# Patient Record
Sex: FEMALE | Race: WHITE | NOT HISPANIC OR LATINO | URBAN - METROPOLITAN AREA
[De-identification: names, ages, dates, MRNs, and addresses within clinical notes are randomized per-mention and may not be internally consistent; named-entity substitution may affect disease eponyms.]

---

## 2017-04-13 ENCOUNTER — OUTPATIENT (OUTPATIENT)
Dept: OUTPATIENT SERVICES | Facility: HOSPITAL | Age: 67
LOS: 1 days | Discharge: HOME | End: 2017-04-13

## 2017-06-27 DIAGNOSIS — R92.8 OTHER ABNORMAL AND INCONCLUSIVE FINDINGS ON DIAGNOSTIC IMAGING OF BREAST: ICD-10-CM

## 2018-04-16 ENCOUNTER — OUTPATIENT (OUTPATIENT)
Dept: OUTPATIENT SERVICES | Facility: HOSPITAL | Age: 68
LOS: 1 days | Discharge: HOME | End: 2018-04-16

## 2018-04-16 DIAGNOSIS — Z12.31 ENCOUNTER FOR SCREENING MAMMOGRAM FOR MALIGNANT NEOPLASM OF BREAST: ICD-10-CM

## 2019-02-03 ENCOUNTER — EMERGENCY (EMERGENCY)
Facility: HOSPITAL | Age: 69
LOS: 1 days | Discharge: HOME | End: 2019-02-05
Attending: EMERGENCY MEDICINE | Admitting: EMERGENCY MEDICINE

## 2019-02-03 VITALS
TEMPERATURE: 97 F | DIASTOLIC BLOOD PRESSURE: 63 MMHG | HEART RATE: 108 BPM | RESPIRATION RATE: 18 BRPM | OXYGEN SATURATION: 95 % | SYSTOLIC BLOOD PRESSURE: 126 MMHG

## 2019-02-03 LAB
ALBUMIN SERPL ELPH-MCNC: 4.1 G/DL — SIGNIFICANT CHANGE UP (ref 3.5–5.2)
ALP SERPL-CCNC: 79 U/L — SIGNIFICANT CHANGE UP (ref 30–115)
ALT FLD-CCNC: 24 U/L — SIGNIFICANT CHANGE UP (ref 0–41)
ANION GAP SERPL CALC-SCNC: 19 MMOL/L — HIGH (ref 7–14)
APPEARANCE UR: ABNORMAL
AST SERPL-CCNC: 24 U/L — SIGNIFICANT CHANGE UP (ref 0–41)
BACTERIA # UR AUTO: ABNORMAL /HPF
BILIRUB SERPL-MCNC: 0.7 MG/DL — SIGNIFICANT CHANGE UP (ref 0.2–1.2)
BILIRUB UR-MCNC: NEGATIVE — SIGNIFICANT CHANGE UP
BUN SERPL-MCNC: 26 MG/DL — HIGH (ref 10–20)
CALCIUM SERPL-MCNC: 9.3 MG/DL — SIGNIFICANT CHANGE UP (ref 8.5–10.1)
CHLORIDE SERPL-SCNC: 96 MMOL/L — LOW (ref 98–110)
CO2 SERPL-SCNC: 23 MMOL/L — SIGNIFICANT CHANGE UP (ref 17–32)
COLOR SPEC: SIGNIFICANT CHANGE UP
CREAT SERPL-MCNC: 1.1 MG/DL — SIGNIFICANT CHANGE UP (ref 0.7–1.5)
DIFF PNL FLD: NEGATIVE — SIGNIFICANT CHANGE UP
EPI CELLS # UR: ABNORMAL /HPF
GLUCOSE SERPL-MCNC: 145 MG/DL — HIGH (ref 70–99)
GLUCOSE UR QL: NEGATIVE — SIGNIFICANT CHANGE UP
HCT VFR BLD CALC: 42.5 % — SIGNIFICANT CHANGE UP (ref 37–47)
HGB BLD-MCNC: 13.9 G/DL — SIGNIFICANT CHANGE UP (ref 12–16)
KETONES UR-MCNC: NEGATIVE — SIGNIFICANT CHANGE UP
LEUKOCYTE ESTERASE UR-ACNC: ABNORMAL
LIDOCAIN IGE QN: 32 U/L — SIGNIFICANT CHANGE UP (ref 7–60)
MCHC RBC-ENTMCNC: 28.1 PG — SIGNIFICANT CHANGE UP (ref 27–31)
MCHC RBC-ENTMCNC: 32.7 G/DL — SIGNIFICANT CHANGE UP (ref 32–37)
MCV RBC AUTO: 85.9 FL — SIGNIFICANT CHANGE UP (ref 81–99)
NITRITE UR-MCNC: NEGATIVE — SIGNIFICANT CHANGE UP
NRBC # BLD: 0 /100 WBCS — SIGNIFICANT CHANGE UP (ref 0–0)
PH UR: 6 — SIGNIFICANT CHANGE UP (ref 5–8)
PLATELET # BLD AUTO: 333 K/UL — SIGNIFICANT CHANGE UP (ref 130–400)
POTASSIUM SERPL-MCNC: 4.4 MMOL/L — SIGNIFICANT CHANGE UP (ref 3.5–5)
POTASSIUM SERPL-SCNC: 4.4 MMOL/L — SIGNIFICANT CHANGE UP (ref 3.5–5)
PROT SERPL-MCNC: 7.2 G/DL — SIGNIFICANT CHANGE UP (ref 6–8)
PROT UR-MCNC: ABNORMAL
RBC # BLD: 4.95 M/UL — SIGNIFICANT CHANGE UP (ref 4.2–5.4)
RBC # FLD: 14.3 % — SIGNIFICANT CHANGE UP (ref 11.5–14.5)
SODIUM SERPL-SCNC: 138 MMOL/L — SIGNIFICANT CHANGE UP (ref 135–146)
SP GR SPEC: >=1.03 — SIGNIFICANT CHANGE UP (ref 1.01–1.03)
TROPONIN T SERPL-MCNC: <0.01 NG/ML — SIGNIFICANT CHANGE UP
UROBILINOGEN FLD QL: 0.2 — SIGNIFICANT CHANGE UP (ref 0.2–0.2)
WBC # BLD: 11.17 K/UL — HIGH (ref 4.8–10.8)
WBC # FLD AUTO: 11.17 K/UL — HIGH (ref 4.8–10.8)
WBC UR QL: ABNORMAL /HPF

## 2019-02-03 RX ORDER — ACETAMINOPHEN 500 MG
650 TABLET ORAL ONCE
Qty: 0 | Refills: 0 | Status: COMPLETED | OUTPATIENT
Start: 2019-02-03 | End: 2019-02-03

## 2019-02-03 RX ORDER — CEFTRIAXONE 500 MG/1
1 INJECTION, POWDER, FOR SOLUTION INTRAMUSCULAR; INTRAVENOUS ONCE
Qty: 0 | Refills: 0 | Status: COMPLETED | OUTPATIENT
Start: 2019-02-03 | End: 2019-02-03

## 2019-02-03 RX ORDER — SODIUM CHLORIDE 9 MG/ML
1000 INJECTION, SOLUTION INTRAVENOUS ONCE
Qty: 0 | Refills: 0 | Status: COMPLETED | OUTPATIENT
Start: 2019-02-03 | End: 2019-02-03

## 2019-02-03 RX ADMIN — CEFTRIAXONE 100 GRAM(S): 500 INJECTION, POWDER, FOR SOLUTION INTRAMUSCULAR; INTRAVENOUS at 23:38

## 2019-02-03 RX ADMIN — Medication 650 MILLIGRAM(S): at 20:32

## 2019-02-03 RX ADMIN — SODIUM CHLORIDE 1000 MILLILITER(S): 9 INJECTION, SOLUTION INTRAVENOUS at 20:32

## 2019-02-03 NOTE — ED PROVIDER NOTE - OBJECTIVE STATEMENT
69 yo female with PMH DM, HTN, hypothyroidism, HLD, Hep A presented c/o abdominal pain x 1 day. Pt also reports intermittent CP, only with exertion. States feels SOB at times with climbing stairs and pain comes on with exertion. + dry cough and loose stools. No vomiting, urinary complaints or flank pain. + dull HA; denies any focal weakness or paresthesias; did not treat with any OTC meds. Denies trauma.

## 2019-02-03 NOTE — ED PROVIDER NOTE - PHYSICAL EXAMINATION
VITAL SIGNS: noted  CONSTITUTIONAL: Well-developed; well-nourished; in no acute distress  HEAD: Normocephalic; atraumatic  EYES: PERRL, EOM intact; conjunctiva and sclera clear  ENT: No nasal discharge; airway clear. MMM  NECK: Supple; non tender.   CARD: S1, S2 normal; no murmurs, gallops, or rubs. Regular rate and rhythm  RESP: CTAB/L, no wheezes, rales or rhonchi  ABD: Normal bowel sounds; soft; non-distended; +LLQ tenderness without any rebound or guarding; no hepatosplenomegaly. No CVA tenderness  EXT: Normal ROM. No calf tenderness or edema. Distal pulses intact  NEURO: Alert, oriented. Grossly unremarkable. No focal deficits  SKIN: Skin exam is warm and dry, no acute rash  MS: No midline spinal tenderness

## 2019-02-03 NOTE — ED ADULT TRIAGE NOTE - CHIEF COMPLAINT QUOTE
patient c/o abdominal pain/diarrhea for 2 days and dizziness/sob that started today. denies fevers/chills

## 2019-02-03 NOTE — ED CDU PROVIDER INITIAL DAY NOTE - OBJECTIVE STATEMENT
69y/o female with pmh of breast ca, dm, htn, hld, pt. states that yesterday she had some abdominal pain associated with multiple episodes of soft stool, no diarrhea. today pt. developed dizziness associated with nausea, ha and near syncopal episode. now feels better. denies cp, sob, vomiting. pt. is not a smoker. in the past pt. saw dr. Baldwin but now wants to be f/u with dr. Lee. last stress test was a long time ago.

## 2019-02-03 NOTE — ED ADULT NURSE NOTE - OBJECTIVE STATEMENT
Pt presents to ED, sent from urgent care, c/o abdominal cramping, nausea, diarrhea, and fatigue x3 days. Pt reports dizziness & feeling like she was going to pass out. Pt states she no longer feels nausea, denies vomiting. Denies fever/chills.

## 2019-02-03 NOTE — ED CDU PROVIDER INITIAL DAY NOTE - NEURO NEGATIVE STATEMENT, MLM
+ near syncope, + dizziness, no loss of consciousness, no gait abnormality, + headache, no sensory deficits, and no weakness.

## 2019-02-03 NOTE — ED CDU PROVIDER INITIAL DAY NOTE - GASTROINTESTINAL, MLM
Abdomen soft, non-tender, no rebound, no guarding. Abdomen soft, + mild ttp in left lower quadrant, no rebound, no guarding.

## 2019-02-03 NOTE — ED PROVIDER NOTE - MEDICAL DECISION MAKING DETAILS
Pt placed in EDOU for further workup chest pain/DIALLO  r/o ACS. Pt has not had recent cardiac workup. Treated for UTI, CT A/P without acute findings

## 2019-02-03 NOTE — ED CDU PROVIDER INITIAL DAY NOTE - ATTENDING CONTRIBUTION TO CARE
Seen with PA agree with above, lungs- clear, abdomen- soft no tenderness to any region, neuro- non focal, s1s2 no gallops or murmurs, full workup in progress will contiue to re-evaluate

## 2019-02-03 NOTE — ED ADULT NURSE NOTE - NSIMPLEMENTINTERV_GEN_ALL_ED
Implemented All Universal Safety Interventions:  [place] to call system. Call bell, personal items and telephone within reach. Instruct patient to call for assistance. Room bathroom lighting operational. Non-slip footwear when patient is off stretcher. Physically safe environment: no spills, clutter or unnecessary equipment. Stretcher in lowest position, wheels locked, appropriate side rails in place.

## 2019-02-03 NOTE — ED ADULT NURSE NOTE - PMH
Acid reflux    Breast cancer, left    Diabetes    Diverticulitis    Hepatitis A    HTN (hypertension)    Hypercholesteremia    Hypothyroid

## 2019-02-03 NOTE — ED PROVIDER NOTE - NS ED ROS FT
Constitutional: see HPI  Eyes:  No visual changes, eye pain or discharge.  ENMT:  No hearing changes, pain, discharge or infections. No neck pain or stiffness.  Cardiac:  see HPI  Respiratory:  + occasional dry cough, no respiratory distress. No hemoptysis. No history of asthma or RAD.  GI:  see HPI  :  No dysuria, frequency or burning.  MS:  No myalgia, muscle weakness, joint pain or back pain.  Neuro:  + headache, no weakness.  No LOC.  Skin:  No skin rash.   Endocrine: + history of thyroid disease & diabetes.

## 2019-02-04 LAB — TROPONIN T SERPL-MCNC: <0.01 NG/ML — SIGNIFICANT CHANGE UP

## 2019-02-04 RX ORDER — SODIUM CHLORIDE 9 MG/ML
1000 INJECTION INTRAMUSCULAR; INTRAVENOUS; SUBCUTANEOUS ONCE
Qty: 0 | Refills: 0 | Status: COMPLETED | OUTPATIENT
Start: 2019-02-04 | End: 2019-02-04

## 2019-02-04 RX ORDER — CEFTRIAXONE 500 MG/1
1000 INJECTION, POWDER, FOR SOLUTION INTRAMUSCULAR; INTRAVENOUS ONCE
Qty: 0 | Refills: 0 | Status: COMPLETED | OUTPATIENT
Start: 2019-02-04 | End: 2019-02-04

## 2019-02-04 RX ORDER — ADENOSINE 3 MG/ML
60 INJECTION INTRAVENOUS ONCE
Qty: 0 | Refills: 0 | Status: COMPLETED | OUTPATIENT
Start: 2019-02-04 | End: 2019-02-04

## 2019-02-04 RX ORDER — ACETAMINOPHEN 500 MG
650 TABLET ORAL ONCE
Qty: 0 | Refills: 0 | Status: COMPLETED | OUTPATIENT
Start: 2019-02-04 | End: 2019-02-04

## 2019-02-04 RX ADMIN — CEFTRIAXONE 100 GRAM(S): 500 INJECTION, POWDER, FOR SOLUTION INTRAMUSCULAR; INTRAVENOUS at 23:50

## 2019-02-04 RX ADMIN — SODIUM CHLORIDE 10 MILLILITER(S): 9 INJECTION INTRAMUSCULAR; INTRAVENOUS; SUBCUTANEOUS at 13:10

## 2019-02-04 RX ADMIN — Medication 650 MILLIGRAM(S): at 01:27

## 2019-02-04 RX ADMIN — ADENOSINE 600 MILLIGRAM(S): 3 INJECTION INTRAVENOUS at 09:50

## 2019-02-04 NOTE — ED CDU PROVIDER SUBSEQUENT DAY NOTE - PROGRESS NOTE DETAILS
pt. had normal stress test and echo, pt. had a few loose stools, will keep in obs for another night. will continue to reassess.

## 2019-02-04 NOTE — ED ADULT NURSE REASSESSMENT NOTE - NS ED NURSE REASSESS COMMENT FT1
Pt assessed A/o times 4 Vs stable denies no chest pain, no  SOB, no N/V ambulate steady with one person stand by ,pt is seen evaluate  by ED attending ready to be transport for 2DECHO with transporter .

## 2019-02-04 NOTE — ED ADULT NURSE REASSESSMENT NOTE - CONDITION
unchanged/Patient is alert and oriented. Able to ambulate with steady gait independently. States she still feels a little weak and nauseous when she tries to eat something. No vomiting. Continuous cardiac monitoring in progress. Will continue to monitor patient. Patient able to make needs known.

## 2019-02-04 NOTE — ED ADULT NURSE REASSESSMENT NOTE - NS ED NURSE REASSESS COMMENT FT1
patient admitted to observation for further observation of near syncope and UTI.  Patient made familiar to her surroundings.  Patient placed on cardiac monitor. Patient family at bedside. Patient in stable condition and nad. Patient offers no complaints at this time.  Will continue to monitor.

## 2019-02-04 NOTE — ED CDU PROVIDER SUBSEQUENT DAY NOTE - HISTORY
pt seen bedside, NAD, endorsed to me by night team, scheduled for pharm nuclear and echo. will continue to monitor patient.

## 2019-02-04 NOTE — ED ADULT NURSE REASSESSMENT NOTE - NS ED NURSE REASSESS COMMENT FT1
Pt reassessed A/O times 4 Vs stable report filling slight better , report total 4 time loss stool ,dinner tray provide  eat 100% denies no nausea no vomiting , ambulate steady safety precaution on progress assistance provide ED attending made aware on going nursing observation .

## 2019-02-05 VITALS
OXYGEN SATURATION: 95 % | HEART RATE: 81 BPM | RESPIRATION RATE: 20 BRPM | SYSTOLIC BLOOD PRESSURE: 133 MMHG | TEMPERATURE: 97 F | DIASTOLIC BLOOD PRESSURE: 61 MMHG

## 2019-02-05 RX ORDER — FLUCONAZOLE 150 MG/1
1 TABLET ORAL
Qty: 1 | Refills: 0
Start: 2019-02-05 | End: 2019-02-05

## 2019-02-05 RX ORDER — NITROFURANTOIN MACROCRYSTAL 50 MG
1 CAPSULE ORAL
Qty: 10 | Refills: 0
Start: 2019-02-05 | End: 2019-02-09

## 2019-02-05 NOTE — ED CDU PROVIDER SUBSEQUENT DAY NOTE - HISTORY
pt seen bedside, NAD, ambulates, feeling better still having loose bowel movements but less watery then yesterday. pt feels comfortable to go home when daughter comes. will continue to monitor patient.

## 2019-02-05 NOTE — ED ADULT NURSE REASSESSMENT NOTE - CONDITION
improved/Patient is ambulating with steady gait to bathroom. No shortness of breath or chest pain. Patient states stool is beginning to be formed.  Safety maintained.

## 2019-02-05 NOTE — ED CDU PROVIDER DISPOSITION NOTE - CLINICAL COURSE
Patient was sent to EDOU for further evaluation of epigastric and chest discomfort, has remained in no distress and with stable vitals, ekgs times two no evidence of ischemic changes, enzymes normal, CT abd- pelvis normal, Nuclear stress testing was read as normal, Patient received multiple litters of IV fluids feeling better, also received IV antibiotics, discharged home on PO antibiotics with daughter

## 2019-02-07 DIAGNOSIS — Z87.891 PERSONAL HISTORY OF NICOTINE DEPENDENCE: ICD-10-CM

## 2019-02-07 DIAGNOSIS — R07.9 CHEST PAIN, UNSPECIFIED: ICD-10-CM

## 2019-02-07 DIAGNOSIS — R10.13 EPIGASTRIC PAIN: ICD-10-CM

## 2019-02-07 DIAGNOSIS — K52.9 NONINFECTIVE GASTROENTERITIS AND COLITIS, UNSPECIFIED: ICD-10-CM

## 2019-04-23 ENCOUNTER — OUTPATIENT (OUTPATIENT)
Dept: OUTPATIENT SERVICES | Facility: HOSPITAL | Age: 69
LOS: 1 days | Discharge: HOME | End: 2019-04-23
Payer: MEDICARE

## 2019-04-23 DIAGNOSIS — Z12.31 ENCOUNTER FOR SCREENING MAMMOGRAM FOR MALIGNANT NEOPLASM OF BREAST: ICD-10-CM

## 2019-04-23 PROBLEM — B15.9 HEPATITIS A WITHOUT HEPATIC COMA: Chronic | Status: ACTIVE | Noted: 2019-02-03

## 2019-04-23 PROBLEM — K21.9 GASTRO-ESOPHAGEAL REFLUX DISEASE WITHOUT ESOPHAGITIS: Chronic | Status: ACTIVE | Noted: 2019-02-03

## 2019-04-23 PROBLEM — I10 ESSENTIAL (PRIMARY) HYPERTENSION: Chronic | Status: ACTIVE | Noted: 2019-02-03

## 2019-04-23 PROBLEM — E11.9 TYPE 2 DIABETES MELLITUS WITHOUT COMPLICATIONS: Chronic | Status: ACTIVE | Noted: 2019-02-03

## 2019-04-23 PROBLEM — E03.9 HYPOTHYROIDISM, UNSPECIFIED: Chronic | Status: ACTIVE | Noted: 2019-02-03

## 2019-04-23 PROBLEM — K57.92 DIVERTICULITIS OF INTESTINE, PART UNSPECIFIED, WITHOUT PERFORATION OR ABSCESS WITHOUT BLEEDING: Chronic | Status: ACTIVE | Noted: 2019-02-03

## 2019-04-23 PROBLEM — E78.00 PURE HYPERCHOLESTEROLEMIA, UNSPECIFIED: Chronic | Status: ACTIVE | Noted: 2019-02-03

## 2019-04-23 PROBLEM — C50.912 MALIGNANT NEOPLASM OF UNSPECIFIED SITE OF LEFT FEMALE BREAST: Chronic | Status: ACTIVE | Noted: 2019-02-03

## 2019-04-23 PROCEDURE — 77067 SCR MAMMO BI INCL CAD: CPT | Mod: 26

## 2019-04-23 PROCEDURE — 77063 BREAST TOMOSYNTHESIS BI: CPT | Mod: 26

## 2019-08-28 ENCOUNTER — OUTPATIENT (OUTPATIENT)
Dept: OUTPATIENT SERVICES | Facility: HOSPITAL | Age: 69
LOS: 1 days | Discharge: HOME | End: 2019-08-28
Payer: MEDICARE

## 2019-08-28 DIAGNOSIS — M25.512 PAIN IN LEFT SHOULDER: ICD-10-CM

## 2019-08-28 PROCEDURE — 73221 MRI JOINT UPR EXTREM W/O DYE: CPT | Mod: 26,LT

## 2020-06-04 ENCOUNTER — OUTPATIENT (OUTPATIENT)
Dept: OUTPATIENT SERVICES | Facility: HOSPITAL | Age: 70
LOS: 1 days | Discharge: HOME | End: 2020-06-04
Payer: MEDICARE

## 2020-06-04 DIAGNOSIS — Z12.31 ENCOUNTER FOR SCREENING MAMMOGRAM FOR MALIGNANT NEOPLASM OF BREAST: ICD-10-CM

## 2020-06-04 PROCEDURE — 77063 BREAST TOMOSYNTHESIS BI: CPT | Mod: 26

## 2020-06-04 PROCEDURE — 77067 SCR MAMMO BI INCL CAD: CPT | Mod: 26

## 2020-09-11 ENCOUNTER — OUTPATIENT (OUTPATIENT)
Dept: OUTPATIENT SERVICES | Facility: HOSPITAL | Age: 70
LOS: 1 days | Discharge: HOME | End: 2020-09-11
Payer: MEDICARE

## 2020-09-11 DIAGNOSIS — R92.8 OTHER ABNORMAL AND INCONCLUSIVE FINDINGS ON DIAGNOSTIC IMAGING OF BREAST: ICD-10-CM

## 2020-09-11 PROCEDURE — G0279: CPT | Mod: 26

## 2020-09-11 PROCEDURE — 77065 DX MAMMO INCL CAD UNI: CPT | Mod: 26,RT

## 2020-09-18 ENCOUNTER — RESULT REVIEW (OUTPATIENT)
Age: 70
End: 2020-09-18

## 2020-09-18 ENCOUNTER — OUTPATIENT (OUTPATIENT)
Dept: OUTPATIENT SERVICES | Facility: HOSPITAL | Age: 70
LOS: 1 days | Discharge: HOME | End: 2020-09-18
Payer: MEDICARE

## 2020-09-18 PROCEDURE — 88305 TISSUE EXAM BY PATHOLOGIST: CPT | Mod: 26

## 2020-09-18 PROCEDURE — 19081 BX BREAST 1ST LESION STRTCTC: CPT | Mod: RT

## 2020-09-18 PROCEDURE — 19082 BX BREAST ADD LESION STRTCTC: CPT | Mod: RT

## 2020-09-22 DIAGNOSIS — N60.91 UNSPECIFIED BENIGN MAMMARY DYSPLASIA OF RIGHT BREAST: ICD-10-CM

## 2020-09-22 DIAGNOSIS — N63.10 UNSPECIFIED LUMP IN THE RIGHT BREAST, UNSPECIFIED QUADRANT: ICD-10-CM

## 2020-10-15 PROBLEM — Z00.00 ENCOUNTER FOR PREVENTIVE HEALTH EXAMINATION: Status: ACTIVE | Noted: 2020-10-15

## 2020-10-26 ENCOUNTER — OUTPATIENT (OUTPATIENT)
Dept: OUTPATIENT SERVICES | Facility: HOSPITAL | Age: 70
LOS: 1 days | Discharge: HOME | End: 2020-10-26

## 2020-10-26 DIAGNOSIS — Z11.59 ENCOUNTER FOR SCREENING FOR OTHER VIRAL DISEASES: ICD-10-CM

## 2020-10-29 ENCOUNTER — OUTPATIENT (OUTPATIENT)
Dept: OUTPATIENT SERVICES | Facility: HOSPITAL | Age: 70
LOS: 1 days | Discharge: HOME | End: 2020-10-29
Payer: MEDICARE

## 2020-10-29 DIAGNOSIS — I48.91 UNSPECIFIED ATRIAL FIBRILLATION: ICD-10-CM

## 2020-10-29 DIAGNOSIS — C50.912 MALIGNANT NEOPLASM OF UNSPECIFIED SITE OF LEFT FEMALE BREAST: Chronic | ICD-10-CM

## 2020-10-29 PROCEDURE — 93010 ELECTROCARDIOGRAM REPORT: CPT

## 2020-10-29 RX ORDER — ASPIRIN/CALCIUM CARB/MAGNESIUM 324 MG
1 TABLET ORAL
Qty: 0 | Refills: 0 | DISCHARGE

## 2020-10-29 RX ORDER — LOSARTAN POTASSIUM 100 MG/1
1 TABLET, FILM COATED ORAL
Qty: 0 | Refills: 0 | DISCHARGE

## 2020-10-29 RX ORDER — LEVOTHYROXINE SODIUM 125 MCG
1 TABLET ORAL
Qty: 0 | Refills: 0 | DISCHARGE

## 2020-10-29 RX ORDER — BISOPROLOL FUMARATE 10 MG/1
1 TABLET, FILM COATED ORAL
Qty: 0 | Refills: 0 | DISCHARGE

## 2020-10-29 RX ORDER — OMEPRAZOLE 10 MG/1
1 CAPSULE, DELAYED RELEASE ORAL
Qty: 0 | Refills: 0 | DISCHARGE

## 2020-10-29 NOTE — CHART NOTE - NSCHARTNOTEFT_GEN_A_CORE
POST OPERATIVE PROCEDURAL DOCUMENTATION  PRE-OP DIAGNOSIS: afib    POST-OP DIAGNOSIS: post DOLLY successful cardioversion    PROCEDURE: Transesophageal echocardiogram    Primary Physician: Mackenzie Lee MD  Fellow:    ANESTHESIA TYPE  [  ] General Anesthesia  [ x ] Conscious Sedation  [  ] Local/Regional    CONDITION  [  ] Critical  [  ] Serious  [  ] Fair  [x  ] Good    SPECIMENS REMOVED (IF APPLICABLE): NA    IMPLANTS (IF APPLICABLE): None    ESTIMATED BLOOD LOSS: None    COMPLICATIONS: None    FINDINGS  DIAGNOSIS/IMPRESSION:  dilated LA, RA  intact IAS, clear EFRA, low velocity doppler, no thrombus  normal valves, mild moderate MR, moderate TR, trace AI and PI  normal EF 55-60%, normal RV function  atheroma in the ascending and descending aorta  no mass or thrombus    PLAN OF CARE:  2 attempts for the cardioversion, 150Jules bidirectional failed but 200 Dangelo bidirectional synchronized attempt via AP pads was successful, converted to NSR  current therapy, observation and d/c home

## 2020-10-29 NOTE — H&P CARDIOLOGY - HISTORY OF PRESENT ILLNESS
71 YO female with accidental finding of atrial fibrillation scheduled for the DOLLY and cardioversion.   she generally is stable, has no chest pain, sob, at rest but DIALLO and occasional palpitation, brief  PMXHx: DM, HTN, HLP, hypothyroid and recent A.fib  All: Sulfa

## 2020-11-30 ENCOUNTER — APPOINTMENT (OUTPATIENT)
Dept: BREAST CENTER | Facility: CLINIC | Age: 70
End: 2020-11-30
Payer: MEDICARE

## 2020-11-30 VITALS
WEIGHT: 222 LBS | DIASTOLIC BLOOD PRESSURE: 90 MMHG | TEMPERATURE: 97.6 F | HEIGHT: 60 IN | BODY MASS INDEX: 43.59 KG/M2 | SYSTOLIC BLOOD PRESSURE: 152 MMHG

## 2020-11-30 DIAGNOSIS — Z86.79 PERSONAL HISTORY OF OTHER DISEASES OF THE CIRCULATORY SYSTEM: ICD-10-CM

## 2020-11-30 DIAGNOSIS — Z85.3 PERSONAL HISTORY OF MALIGNANT NEOPLASM OF BREAST: ICD-10-CM

## 2020-11-30 DIAGNOSIS — Z86.69 PERSONAL HISTORY OF OTHER DISEASES OF THE NERVOUS SYSTEM AND SENSE ORGANS: ICD-10-CM

## 2020-11-30 DIAGNOSIS — Z86.39 PERSONAL HISTORY OF OTHER ENDOCRINE, NUTRITIONAL AND METABOLIC DISEASE: ICD-10-CM

## 2020-11-30 PROCEDURE — 99203 OFFICE O/P NEW LOW 30 MIN: CPT

## 2020-11-30 NOTE — ASSESSMENT
[FreeTextEntry1] : Brittnee is a 70 F with a past history of left breast cancer, now with three areas of calcifications in her right breast, two of which were biopsy proven atypical ductal hyperplasia. \par \par On exam, she had b/l nondiscrete nodularities palpated throughout both breasts, but no suspicious abnormalities were palpated within either breast. \par \par Her msot recent imaging was a b/l screening mammogram and R dx mammogram on 6/4/2020 and 9/11/2020 which revealed three areas of calcifications in her right breast, superior (ADH and mucocele like lesion), lateral (ADH, FEA, radial scar, and ALH), and central (was NOT BIOPSIED YET). \par \par We discussed the need for a third stereotactic guided biopsy in the central R breast, however, she would like to forego the biopsy and proceed with a excisional biopsy of this area.  (this was discussed with Dr. Dutta)\par \par We discussed atypical ductal hyperplasia.  These are considered benign high risk lesions.  The recommendation is for surgical excision. The reason for surgical excision is because there is about a 15-30% upgrade rate to either DCIS or invasive cancer.  Additionally, the presence of ADH has been associated with an increased lifetime risk of breast cancer by about 4 fold.\par \par We discussed atypical lobular hyperplasia (ALH). \par These lesions are considered benign high risk lesions. \par   \par Patients with these lesions were found to have a increased relative risk of breast cancer, up to four fold  compared to the reference population.  However the lesions themselves do not have any malignant potential. \par The upgrade rate (to cancer) ranges from 0-10% on surgical excision, with pathologic/radiologic concordance, however older studies have documented up to a 60% upgrade rate with lobular neoplasias, including ALH.  \par I have recommended surgical excision for this reason.  \par \par Because it is not readily palpable, I will have her undergo a preoperative radiofrequency tag localization of both biopsy proven ADH (superior and lateral).  In addition, I will have her third area of calcifications in her right central breast also localized with an RF tag. \par \par The risks and benefits of the procedure were explained to the patient, including bleeding, infection, seroma/hematoma formation, and possible re-operation if the surgical excision yields an upgrade to cancer with positive margins. Informed consent was obtained today.\par \par She is on eliquis for atrial fibrillation, so if possible, would like to perform SAME DAY localization of her lesions so that she does not need to stop her anticoagulation multiple times. \par \par All of her questions were answered.  She knows to call with any further questions or concerns. \par \par PLAN: \par -OR: RIGHT BREAST WIDE LOCAL EXCISION OF THREE AREAS WITH RF LOCALIZATION X 3 (same day 2/2 eliquis for a-fib)\par -DIAGNOSIS: RIGHT BREAST ATYPICAL DUCTAL HYPERPLASIA \par -will forego third stereotactic guided biopsy of her central R breast per patient's request and perform an excisional biopsy instead \par -f/up after

## 2020-11-30 NOTE — REVIEW OF SYSTEMS
[Negative] : Heme/Lymph [Fever] : no fever [Chills] : no chills [As Noted in HPI] : as noted in HPI [Skin Lesions] : no skin lesions [Skin Wound] : no skin wound [Breast Pain] : no breast pain [Breast Lump] : no breast lump [Hot Flashes] : no hot flashes

## 2020-11-30 NOTE — DATA REVIEWED
[FreeTextEntry1] : EXAM: MG MAMMO SCREEN W AMELIA BI#\par \par \par PROCEDURE DATE: 06/04/2020\par \par \par \par INTERPRETATION: HISTORY:\par Bilateral MG MAMMO SCREEN W AMELIA BI# was performed. Patient is 69 years old\par and is seen for screening. The patient has a history of other cancer at age\par 47. The patient has a history of left lumpectomy at age 57 - malignant, left\par fine needle aspiration biopsy and right excisional biopsy. The patient has\par no family history of breast cancer.\par \par RISK ASSESSMENT:\par Raulito-Megan Lifetime Risk: 8.5\par \par CLINICAL BREAST EXAM:\par The patient reports her last clinical breast exam was performed 6 months ago.\par \par COMPARISON STUDIES:\par The present examination has been compared to prior imaging studies performed\par at [facility] on 04/13/2017, 04/16/2018 and\par 04/23/2019.\par \par MAMMOGRAM FINDINGS:\par Mammography was performed including the following views: bilateral\par craniocaudal with tomosynthesis, bilateral mediolateral oblique with\par tomosynthesis. The examination includes digital synthetic 2D and digital\par tomosynthesis 3D images. Additional imaging analysis was performed using CAD\par (computer-aided detection) software.\par \par The breasts are heterogeneously dense, which may obscure small masses.\par \par There are calcifications seen in the upper outer quadrant of the right\par breast.\par \par No suspicious mass, grouping of calcifications, or other abnormality is\par identified in the left breast.\par \par IMPRESSION:\par Calcifications in the right breast require additional evaluation.\par \par RECOMMENDATION:\par Patient will be recalled for additional views.\par \par ASSESSMENT:\par BI-RADS Category 0: Incomplete: Needs Additional Imaging Evaluation\par \par The patient will be notified of these results by telephone, and will also be\par mailed a written summary in layman's terms.\par \par EXAM: MG MAMMO DIAG W AMELIA RT#\par \par \par PROCEDURE DATE: 09/11/2020\par \par \par \par INTERPRETATION: Clinical History / Reason for exam: The patient returns for additional imaging of right breast from screening mammogram dated 6/4/2020.\par \par FAMILY HISTORY OF BREAST CANCER: None\par \par VIEWS: Right breast magnification images in CC and 90 degree projections includung full 90 degree and tomosynthesis. CAD was also utilized.\par \par COMPARISON: Dating back to 3/30/2015\par \par BREAST COMPOSITION: The breasts are heterogeneously dense, which may obscure small masses.\par \par FINDINGS:\par There are grouped indeterminate microcalcifications in the right superior central breast which do not layer on the 90 degrees magnification image. Two additional smaller groups of microcalcifications are noted in the central and lateral breast more posteriorly which demonstrate similar morphology.\par \par \par IMPRESSION:\par \par Indeterminate microcalcifications right superior and central to inferior breast. Stereotactic guided biopsy is suggested at these 2 sites. Management of the remaining calcifications to be based upon pathology result.\par \par Recommendation: Stereotactic guided biopsy.\par \par BI-RADS Category 4: Suspicious\par \par \par The findings and recommendations were discussed with the patient prior to departure.\par \par \par \par \par \par \par \par CARROLL LYNN M.D., ATTENDING RADIOLOGIST\par This document has been electronically signed. Sep 11 2020 5:49PM\par \par EXAM: MG STEREO BX ADD RT SISC\par EXAM: MG STEREO BX 1ST RT SISC\par \par *** ADDENDUM 09/21/2020 ***\par \par FINAL DIAGNOSIS\par 1. BREAST, RIGHT SUPERIOR CALCIFICATIONS, STEREOTACTIC GUIDED\par VACUUM ASSISTED NEEDLE CORE BIOPSIES:\par - FOCAL ATYPICAL DUCTAL HYPERPLASIA (ADH), MICROPAPILLARY AND\par CRIBRIFORM TYPES ASSOCIATED WITH CALCIFICATIONS.\par - A MICROSCOPIC MUCOCELE-LIKE LESION, 2.0 MM (MICROSCOPIC\par MEASUREMENT) IN THIS BIOPSY MATERIAL.\par - PROLIFERATIVE TYPE FIBROCYSTIC CHANGES ASSOCIATED WITH\par MICROCALCIFICATIONS.\par - RADIOGRAPHIC/PATHOLOGIC CORRELATION WITH THE SPECIMEN'S DIGITAL\par IMAGE VIEWED ON PACS IS PERFORMED.\par \par 2. BREAST, RIGHT CENTRAL CALCIFICATIONS, STEREOTACTIC GUIDED\par VACUUM ASSISTED NEEDLE CORE BIOPSIES:\par - ATYPICAL DUCTAL HYPERPLASIA (ADH), MICROPAPILLARY TYPE AND FLAT\par EPITHELIAL ATYPIA (FEA); BOTH ASSOCIATED WITH MICROCALCIFICATIONS\par AND MOST LIKELY ORIGINATING IN A RADIAL SCLEROSING LESION (RADIAL\par SCAR).\par - SINGLE MICROSCOPIC FOCUS OF ATYPICAL LOBULAR HYPERPLASIA (ALH),\par 1.0 MM IN THIS BIOPSY MATERIAL (MICROSCOPIC MEASUREMENT).\par - PROLIFERATIVE TYPE FIBROCYSTIC CHANGES ASSOCIATED WITH\par MICROCALCIFICATIONS.\par - RADIOGRAPHIC/PATHOLOGIC CORRELATION WITH THE SPECIMEN'S DIGITAL\par IMAGE VIEWED ON PACS IS PERFORMED.\par \par VERIFIED BY: ANGELIQUE MAGDALENO M.D.\par \par There is radiologic pathologic correlation. The biopsy result is high risk at both sites. Surgical management is suggested.\par \par The patient has an additional group of similar appearing calcifications in the right lateral breast. Tissue sampling of this third site is also suggested. Stereotactic guided or surgical biopsy can be performed.\par \par The findings and recommendations were discussed with Dr. Srinath Llanos on 9/21/2020 at 6:05 PM. He will be informing the patient.\par \par \par *** END OF ADDENDUM 09/21/2020 ***\par \par \par \par PROCEDURE DATE: 09/18/2020\par \par \par \par INTERPRETATION: Clinical History / Reason for exam: 69 old patient for right breast stereotactic vacuum assisted core biopsies and post clip placement two view right breast mammogram, recommended on 9/11/2020 . Three groups of indeterminate microcalcifications were seen, stereotactic guided biopsy was suggested for 2 groups .\par \par Previous films and reports were reviewed. The procedure, its risks, benefits, and alternatives were explained to the patient, who expressed understanding and gave informed written and verbal consent. A time-out, which included the patient's full name, date of birth, description of the expected procedure and procedure site, was performed immediately before the procedure.\par \par Site 1-right superior breast :\par \par A superior approach was selected for the procedure. Using the usual sterile technique, 1% lidocaine as local anesthetic and deeper anesthetic containing epinephrine and stereotactic guidance, the previously described calcifications in the superior central breast were biopsied using a 9 gauge vacuum-assisted device. A single pass was made and 6 samples were collected. Specimen radiography demonstrated the calcifications to be contained within the specimen, confirming accurate targeting. A (Hologic mini-cork) localizing clip was then placed into the biopsy cavity. Hemostasis was obtained and a sterile dressing was applied. The specimens were sorted. Those containing calcifications were placed in a separate container. All were submitted for pathology.\par \par \par Site 2-right central breast:\par \par The same procedure was repeated for the microcalcifications in the right central breast. Lateral approach was utilized. The site was marked with a [employer] top hat shaped metallic marker. The specimens were sorted. Those containing calcifications were placed in a separate container. All were submitted for pathology.\par \par \par A right breast mammogram performed in the CC and lateral projections. The biopsy markers are seen in appropriate position.\par \par There were no immediate complications. The patient tolerated the procedure well and left the department in good condition. Written discharge instructions were discussed and provided to the patient.\par \par IMPRESSION:\par \par Successful stereotactic guided biopsy of the right breast microcalcifications at 2 sites as above. Management of the remaining third group to be based upon pathology result.\par \par \par Pathology: Pending, to be reported as an addendum.\par ***Please see the addendum at the top of this report. It may contain additional important information or changes.****\par \par \par \par \par CARROLL LYNN M.D., ATTENDING RADIOLOGIST\par This document has been electronically signed. Sep 18 2020 11:50AM\par Addend: CARROLL LYNN M.D., ATTENDING RADIOLOGIST\par This addendum was electronically signed on: Sep 21 2020 6:16PM.\par

## 2020-11-30 NOTE — PAST MEDICAL HISTORY
[Menarche Age ____] : age at menarche was [unfilled] [Menopause Age____] : age at menopause was [unfilled] [History of Hormone Replacement Treatment] : has no history of hormone replacement treatment [Total Preg ___] : G[unfilled] [Live Births ___] : P[unfilled]  [Age At Live Birth ___] : Age at live birth: [unfilled] [FreeTextEntry5] : s/p ERNESTINE/DILIA in 2015 [FreeTextEntry6] : denies [FreeTextEntry7] : yes x 3 months, stopped in 1973 [FreeTextEntry8] : denies

## 2020-11-30 NOTE — HISTORY OF PRESENT ILLNESS
[FreeTextEntry1] : Brittnee is a 70 F with two areas of R breast ADH, and a third area of calcifications which were not biopsied.  \par \par Past breast cancer history: \par -s/p L lumpectomy and SLN Bx in \par -s/p chemo with herceptin (per patient, for stage 1 breast cancer) \par -s/p L WBI x 30 cycles\par \par She has no breast related complaints at this time.  She denies any breast pain, has not palpated any new palpable masses in either breast and denies any nipple discharge or retraction.  \par \par Her work up was as follows: \par 2020 -- b/l screening mammogram \par -heterogenously dense breasts\par -calcifications in UOQ, R \par -no suspicious mass, microcalcifications or architectural distortion in L \par BIRADS 0\par \par 2020 -- R dx mammogram \par -grouped microcalcifications in R superior breast \par -TWO additional smaller groups of calcifications in central and lateral breast, more posterior \par BIRADS 4 -- biopsy 2 of the three areas of calcs\par \par 2020 -- R stereotactic guided biopsy \par 1. superior calcs (minicork)\par -atypical ductal hyperplasia\par -mucocele like lesion \par \par 2. central calcifcations (top hat)\par -ADH/flat epithelial atypia\par -radial scar \par -ALH \par \par **Recommendation to biopsy the third group of calcifications in the central breast, not performed yet \par \par Of note, Brittnee was recently diagnosed with atrial fibrillation which required an ablation.  She is currently on Eliquis.  In addition, she does use a CPAP machine at home for obstructive sleep apnea. \par \par HISTORICAL RISK FACTORS: \par -prior breast biopsies and surgeries as above\par -no family history of breast or ovarian cancer \par -, age at first live birth was 26 \par -prior OCP use x 3 months, stopped in \par -s/p ERNESTINE/BSO in  \par \par

## 2020-11-30 NOTE — PHYSICAL EXAM
[Normocephalic] : normocephalic [Atraumatic] : atraumatic [EOMI] : extra ocular movement intact [No Supraclavicular Adenopathy] : no supraclavicular adenopathy [No Cervical Adenopathy] : no cervical adenopathy [Examined in the supine and seated position] : examined in the supine and seated position [Symmetrical] : symmetrical [No dominant masses] : no dominant masses in right breast  [No dominant masses] : no dominant masses left breast [No Nipple Retraction] : no left nipple retraction [No Nipple Discharge] : no left nipple discharge [No Axillary Lymphadenopathy] : no left axillary lymphadenopathy [Soft] : abdomen soft [Not Tender] : non-tender [No Edema] : no edema [No Rashes] : no rashes [No Ulceration] : no ulceration [de-identified] : no suspicious abnormalities palpated within either breast, although she does have b/l nondiscrete nodularities  [de-identified] : h [de-identified] : surgical incisions are well healed

## 2020-12-07 ENCOUNTER — RESULT REVIEW (OUTPATIENT)
Age: 70
End: 2020-12-07

## 2020-12-07 ENCOUNTER — OUTPATIENT (OUTPATIENT)
Dept: OUTPATIENT SERVICES | Facility: HOSPITAL | Age: 70
LOS: 1 days | Discharge: HOME | End: 2020-12-07
Payer: MEDICARE

## 2020-12-07 VITALS
SYSTOLIC BLOOD PRESSURE: 141 MMHG | OXYGEN SATURATION: 100 % | HEART RATE: 103 BPM | WEIGHT: 220.46 LBS | RESPIRATION RATE: 18 BRPM | HEIGHT: 60 IN | DIASTOLIC BLOOD PRESSURE: 67 MMHG | TEMPERATURE: 98 F

## 2020-12-07 DIAGNOSIS — Z01.818 ENCOUNTER FOR OTHER PREPROCEDURAL EXAMINATION: ICD-10-CM

## 2020-12-07 DIAGNOSIS — N60.89 OTHER BENIGN MAMMARY DYSPLASIAS OF UNSPECIFIED BREAST: ICD-10-CM

## 2020-12-07 DIAGNOSIS — C50.912 MALIGNANT NEOPLASM OF UNSPECIFIED SITE OF LEFT FEMALE BREAST: Chronic | ICD-10-CM

## 2020-12-07 DIAGNOSIS — Z90.710 ACQUIRED ABSENCE OF BOTH CERVIX AND UTERUS: Chronic | ICD-10-CM

## 2020-12-07 LAB
A1C WITH ESTIMATED AVERAGE GLUCOSE RESULT: 6.3 % — HIGH (ref 4–5.6)
ALBUMIN SERPL ELPH-MCNC: 4.2 G/DL — SIGNIFICANT CHANGE UP (ref 3.5–5.2)
ALP SERPL-CCNC: 88 U/L — SIGNIFICANT CHANGE UP (ref 30–115)
ALT FLD-CCNC: 18 U/L — SIGNIFICANT CHANGE UP (ref 0–41)
ANION GAP SERPL CALC-SCNC: 11 MMOL/L — SIGNIFICANT CHANGE UP (ref 7–14)
APTT BLD: 41.6 SEC — HIGH (ref 27–39.2)
AST SERPL-CCNC: 18 U/L — SIGNIFICANT CHANGE UP (ref 0–41)
BASOPHILS # BLD AUTO: 0.04 K/UL — SIGNIFICANT CHANGE UP (ref 0–0.2)
BASOPHILS NFR BLD AUTO: 0.7 % — SIGNIFICANT CHANGE UP (ref 0–1)
BILIRUB SERPL-MCNC: 0.6 MG/DL — SIGNIFICANT CHANGE UP (ref 0.2–1.2)
BUN SERPL-MCNC: 15 MG/DL — SIGNIFICANT CHANGE UP (ref 10–20)
CALCIUM SERPL-MCNC: 9.7 MG/DL — SIGNIFICANT CHANGE UP (ref 8.5–10.1)
CHLORIDE SERPL-SCNC: 102 MMOL/L — SIGNIFICANT CHANGE UP (ref 98–110)
CO2 SERPL-SCNC: 26 MMOL/L — SIGNIFICANT CHANGE UP (ref 17–32)
CREAT SERPL-MCNC: 0.9 MG/DL — SIGNIFICANT CHANGE UP (ref 0.7–1.5)
EOSINOPHIL # BLD AUTO: 0 K/UL — SIGNIFICANT CHANGE UP (ref 0–0.7)
EOSINOPHIL NFR BLD AUTO: 0 % — SIGNIFICANT CHANGE UP (ref 0–8)
ESTIMATED AVERAGE GLUCOSE: 134 MG/DL — HIGH (ref 68–114)
GLUCOSE SERPL-MCNC: 113 MG/DL — HIGH (ref 70–99)
HCT VFR BLD CALC: 41.2 % — SIGNIFICANT CHANGE UP (ref 37–47)
HGB BLD-MCNC: 13.2 G/DL — SIGNIFICANT CHANGE UP (ref 12–16)
IMM GRANULOCYTES NFR BLD AUTO: 0.3 % — SIGNIFICANT CHANGE UP (ref 0.1–0.3)
INR BLD: 1.45 RATIO — HIGH (ref 0.65–1.3)
LYMPHOCYTES # BLD AUTO: 1.38 K/UL — SIGNIFICANT CHANGE UP (ref 1.2–3.4)
LYMPHOCYTES # BLD AUTO: 22.4 % — SIGNIFICANT CHANGE UP (ref 20.5–51.1)
MCHC RBC-ENTMCNC: 27.2 PG — SIGNIFICANT CHANGE UP (ref 27–31)
MCHC RBC-ENTMCNC: 32 G/DL — SIGNIFICANT CHANGE UP (ref 32–37)
MCV RBC AUTO: 84.9 FL — SIGNIFICANT CHANGE UP (ref 81–99)
MONOCYTES # BLD AUTO: 0.5 K/UL — SIGNIFICANT CHANGE UP (ref 0.1–0.6)
MONOCYTES NFR BLD AUTO: 8.1 % — SIGNIFICANT CHANGE UP (ref 1.7–9.3)
NEUTROPHILS # BLD AUTO: 4.21 K/UL — SIGNIFICANT CHANGE UP (ref 1.4–6.5)
NEUTROPHILS NFR BLD AUTO: 68.5 % — SIGNIFICANT CHANGE UP (ref 42.2–75.2)
NRBC # BLD: 0 /100 WBCS — SIGNIFICANT CHANGE UP (ref 0–0)
PLATELET # BLD AUTO: 360 K/UL — SIGNIFICANT CHANGE UP (ref 130–400)
POTASSIUM SERPL-MCNC: 3.9 MMOL/L — SIGNIFICANT CHANGE UP (ref 3.5–5)
POTASSIUM SERPL-SCNC: 3.9 MMOL/L — SIGNIFICANT CHANGE UP (ref 3.5–5)
PROT SERPL-MCNC: 7.3 G/DL — SIGNIFICANT CHANGE UP (ref 6–8)
PROTHROM AB SERPL-ACNC: 16.7 SEC — HIGH (ref 9.95–12.87)
RBC # BLD: 4.85 M/UL — SIGNIFICANT CHANGE UP (ref 4.2–5.4)
RBC # FLD: 14.2 % — SIGNIFICANT CHANGE UP (ref 11.5–14.5)
SODIUM SERPL-SCNC: 139 MMOL/L — SIGNIFICANT CHANGE UP (ref 135–146)
WBC # BLD: 6.15 K/UL — SIGNIFICANT CHANGE UP (ref 4.8–10.8)
WBC # FLD AUTO: 6.15 K/UL — SIGNIFICANT CHANGE UP (ref 4.8–10.8)

## 2020-12-07 PROCEDURE — 71046 X-RAY EXAM CHEST 2 VIEWS: CPT | Mod: 26

## 2020-12-07 PROCEDURE — 93010 ELECTROCARDIOGRAM REPORT: CPT

## 2020-12-07 RX ORDER — DILTIAZEM HCL 120 MG
1 CAPSULE, EXT RELEASE 24 HR ORAL
Qty: 0 | Refills: 0 | DISCHARGE

## 2020-12-07 NOTE — H&P PST ADULT - NSICDXFAMILYHX_GEN_ALL_CORE_FT
FAMILY HISTORY:  Family history of cervical cancer, sister  FH: COPD (chronic obstructive pulmonary disease), Brother  FH: lung cancer, Sister  FH: skin cancer, Father  FH: stroke, Father and Mother

## 2020-12-07 NOTE — H&P PST ADULT - TEACHING/LEARNING FACTORS INFLUENCE READINESS TO LEARN OTHER
Your Hemoglobin is normal.   Your platelets are normal  Your electrolytes are normal  Your glucose is abnormal, however your Hemoglobin A1C is normal.  You do not have diabetes.  We need you to reduce carbohydrate intake.  Diet and Exercise.  Follow up in 3 months for level check  Your kidney function is normal.  Your liver function is normal.  Your cholesterol panel is abnormal.  Encourage diet and exercise. Follow-up in 3 months with fasting blood test.  If still abnormal than will discuss role of medication.  Your Thyroid test is normal.   Your vitamin D level is low. You do not need treatment with prescription dose of Vitamin D.  You can take over the counter Vitamin D supplements.  Over-the-counter there are multiple strength, you can start with 1000, 2000, or 3000 international unit once a day.  Recommend follow up in 3-6 months for level check.  Your Vitamin B12 is normal.  Your folic acid is normal  Your urine test is stable however, it shows blood.  We will continue to monitor.   none

## 2020-12-07 NOTE — H&P PST ADULT - REASON FOR ADMISSION
Brittnee aM is a 69 yo male presents to PAST for right breast wide local excision of three areas with radiofrequency localization under LSB on 12/28/2020 at [facility] OR by Dr. Nabila Chaudhary.   Covid testing on 12/26/2020 at 09.30am.

## 2020-12-07 NOTE — H&P PST ADULT - NSICDXPASTMEDICALHX_GEN_ALL_CORE_FT
PAST MEDICAL HISTORY:  Acid reflux     Breast cancer, left     Diabetes     Diverticulitis     Hepatitis A     History of chemotherapy last 2010    HTN (hypertension)     Hypercholesteremia     Hypothyroid     S/P radiation therapy 2008     PAST MEDICAL HISTORY:  Acid reflux     Breast cancer, left     Diabetes     Diverticulitis     Hepatitis A     History of chemotherapy last 2010    HTN (hypertension)     Hypercholesteremia     Hypothyroid     WOOD on CPAP     S/P radiation therapy 2008

## 2020-12-07 NOTE — H&P PST ADULT - NSICDXPASTSURGICALHX_GEN_ALL_CORE_FT
PAST SURGICAL HISTORY:  Adenocarcinoma of breast, left Lymph node resection    S/P total hysterectomy and bilateral salpingo-oophorectomy

## 2020-12-08 LAB
T3 SERPL-MCNC: 121 NG/DL — SIGNIFICANT CHANGE UP (ref 80–200)
T4 AB SER-ACNC: 9.3 UG/DL — SIGNIFICANT CHANGE UP (ref 4.6–12)
TSH SERPL-MCNC: 1.81 UIU/ML — SIGNIFICANT CHANGE UP (ref 0.27–4.2)

## 2020-12-21 ENCOUNTER — NON-APPOINTMENT (OUTPATIENT)
Age: 70
End: 2020-12-21

## 2020-12-23 ENCOUNTER — NON-APPOINTMENT (OUTPATIENT)
Age: 70
End: 2020-12-23

## 2020-12-24 NOTE — ASU PATIENT PROFILE, ADULT - PSH
Adenocarcinoma of breast, left  Lymph node resection  S/P total hysterectomy and bilateral salpingo-oophorectomy

## 2020-12-24 NOTE — ASU PATIENT PROFILE, ADULT - PMH
Acid reflux    Breast cancer, left    Diabetes    Diverticulitis    Hepatitis A    History of chemotherapy  last 2010  HTN (hypertension)    Hypercholesteremia    Hypothyroid    WOOD on CPAP    S/P radiation therapy  2008

## 2020-12-26 ENCOUNTER — LABORATORY RESULT (OUTPATIENT)
Age: 70
End: 2020-12-26

## 2020-12-26 ENCOUNTER — OUTPATIENT (OUTPATIENT)
Dept: OUTPATIENT SERVICES | Facility: HOSPITAL | Age: 70
LOS: 1 days | Discharge: HOME | End: 2020-12-26

## 2020-12-26 DIAGNOSIS — Z90.710 ACQUIRED ABSENCE OF BOTH CERVIX AND UTERUS: Chronic | ICD-10-CM

## 2020-12-26 DIAGNOSIS — Z11.59 ENCOUNTER FOR SCREENING FOR OTHER VIRAL DISEASES: ICD-10-CM

## 2020-12-26 DIAGNOSIS — C50.912 MALIGNANT NEOPLASM OF UNSPECIFIED SITE OF LEFT FEMALE BREAST: Chronic | ICD-10-CM

## 2020-12-26 PROBLEM — Z92.3 PERSONAL HISTORY OF IRRADIATION: Chronic | Status: ACTIVE | Noted: 2020-12-07

## 2020-12-26 PROBLEM — Z92.21 PERSONAL HISTORY OF ANTINEOPLASTIC CHEMOTHERAPY: Chronic | Status: ACTIVE | Noted: 2020-12-07

## 2020-12-26 PROBLEM — G47.33 OBSTRUCTIVE SLEEP APNEA (ADULT) (PEDIATRIC): Chronic | Status: ACTIVE | Noted: 2020-12-07

## 2020-12-28 ENCOUNTER — RESULT REVIEW (OUTPATIENT)
Age: 70
End: 2020-12-28

## 2020-12-28 ENCOUNTER — OUTPATIENT (OUTPATIENT)
Dept: OUTPATIENT SERVICES | Facility: HOSPITAL | Age: 70
LOS: 1 days | Discharge: HOME | End: 2020-12-28
Payer: MEDICARE

## 2020-12-28 ENCOUNTER — APPOINTMENT (OUTPATIENT)
Dept: BREAST CENTER | Facility: AMBULATORY SURGERY CENTER | Age: 70
End: 2020-12-28
Payer: MEDICARE

## 2020-12-28 VITALS
HEART RATE: 111 BPM | OXYGEN SATURATION: 95 % | DIASTOLIC BLOOD PRESSURE: 90 MMHG | SYSTOLIC BLOOD PRESSURE: 129 MMHG | TEMPERATURE: 99 F | RESPIRATION RATE: 15 BRPM

## 2020-12-28 VITALS
WEIGHT: 220.46 LBS | DIASTOLIC BLOOD PRESSURE: 91 MMHG | OXYGEN SATURATION: 98 % | SYSTOLIC BLOOD PRESSURE: 180 MMHG | HEIGHT: 60 IN | HEART RATE: 110 BPM | TEMPERATURE: 99 F | RESPIRATION RATE: 18 BRPM

## 2020-12-28 DIAGNOSIS — Z90.710 ACQUIRED ABSENCE OF BOTH CERVIX AND UTERUS: Chronic | ICD-10-CM

## 2020-12-28 DIAGNOSIS — C50.912 MALIGNANT NEOPLASM OF UNSPECIFIED SITE OF LEFT FEMALE BREAST: Chronic | ICD-10-CM

## 2020-12-28 LAB — GLUCOSE BLDC GLUCOMTR-MCNC: 111 MG/DL — HIGH (ref 70–99)

## 2020-12-28 PROCEDURE — 88305 TISSUE EXAM BY PATHOLOGIST: CPT | Mod: 26

## 2020-12-28 PROCEDURE — 19281 PERQ DEVICE BREAST 1ST IMAG: CPT | Mod: RT

## 2020-12-28 PROCEDURE — 19126 EXCISION ADDL BREAST LESION: CPT | Mod: RT

## 2020-12-28 PROCEDURE — 19125 EXCISION BREAST LESION: CPT | Mod: RT,59

## 2020-12-28 PROCEDURE — 19282 PERQ DEVICE BREAST EA IMAG: CPT | Mod: RT,59

## 2020-12-28 RX ORDER — HYDROMORPHONE HYDROCHLORIDE 2 MG/ML
0.5 INJECTION INTRAMUSCULAR; INTRAVENOUS; SUBCUTANEOUS
Refills: 0 | Status: DISCONTINUED | OUTPATIENT
Start: 2020-12-28 | End: 2020-12-28

## 2020-12-28 RX ORDER — IBUPROFEN 200 MG
1 TABLET ORAL
Qty: 12 | Refills: 0
Start: 2020-12-28 | End: 2020-12-31

## 2020-12-28 RX ORDER — LOSARTAN POTASSIUM 100 MG/1
1 TABLET, FILM COATED ORAL
Qty: 0 | Refills: 0 | DISCHARGE

## 2020-12-28 RX ORDER — SODIUM CHLORIDE 9 MG/ML
1000 INJECTION, SOLUTION INTRAVENOUS
Refills: 0 | Status: DISCONTINUED | OUTPATIENT
Start: 2020-12-28 | End: 2021-01-11

## 2020-12-28 RX ORDER — METOPROLOL TARTRATE 50 MG
1 TABLET ORAL
Qty: 0 | Refills: 0 | DISCHARGE

## 2020-12-28 RX ORDER — OXYCODONE AND ACETAMINOPHEN 5; 325 MG/1; MG/1
1 TABLET ORAL EVERY 4 HOURS
Refills: 0 | Status: DISCONTINUED | OUTPATIENT
Start: 2020-12-28 | End: 2020-12-28

## 2020-12-28 RX ORDER — CHOLECALCIFEROL (VITAMIN D3) 125 MCG
1 CAPSULE ORAL
Qty: 0 | Refills: 0 | DISCHARGE

## 2020-12-28 RX ORDER — APIXABAN 2.5 MG/1
1 TABLET, FILM COATED ORAL
Qty: 0 | Refills: 0 | DISCHARGE

## 2020-12-28 RX ORDER — ONDANSETRON 8 MG/1
4 TABLET, FILM COATED ORAL ONCE
Refills: 0 | Status: DISCONTINUED | OUTPATIENT
Start: 2020-12-28 | End: 2021-01-11

## 2020-12-28 RX ORDER — PANTOPRAZOLE SODIUM 20 MG/1
1 TABLET, DELAYED RELEASE ORAL
Qty: 0 | Refills: 0 | DISCHARGE

## 2020-12-28 RX ORDER — METFORMIN HYDROCHLORIDE 850 MG/1
1 TABLET ORAL
Qty: 0 | Refills: 0 | DISCHARGE

## 2020-12-28 RX ORDER — DILTIAZEM HCL 120 MG
1 CAPSULE, EXT RELEASE 24 HR ORAL
Qty: 0 | Refills: 0 | DISCHARGE

## 2020-12-28 RX ORDER — SIMVASTATIN 20 MG/1
1 TABLET, FILM COATED ORAL
Qty: 0 | Refills: 0 | DISCHARGE

## 2020-12-28 RX ADMIN — SODIUM CHLORIDE 100 MILLILITER(S): 9 INJECTION, SOLUTION INTRAVENOUS at 12:32

## 2020-12-28 NOTE — CHART NOTE - NSCHARTNOTEFT_GEN_A_CORE
PACU ANESTHESIA ADMISSION NOTE    Procedure: Right breast lumpectomy  three separate areas    Post op diagnosis:  Atypical lobular hyperplasia of right breast    Atypical ductal hyperplasia of right breast    __x__  Patent Airway    __x__  Full return of protective reflexes    ____  Full recovery from anesthesia / back to baseline     Vitals:   T: 99.0          R:   21               BP:  135/76                Sat:  98%                P: 109      Mental Status:  _x___ Awake   _____ Alert   _____ Drowsy   _____ Sedated    Nausea/Vomiting:  ____ NO  ______Yes,   See Post - Op Orders          Pain Scale (0-10):  _____    Treatment: ____ None    ___x_ See Post - Op/PCA Orders    Post - Operative Fluids:   ____ Oral   __x__ See Post - Op Orders    Plan: Discharge:   __x_Home       _____Floor     _____Critical Care    _____  Other:_________________    Comments: Pt tolerated procedure well, no anesthesia related complications. Care of pt endorsed to PACU, report given to PACU RN. Discharge when criteria are met.

## 2020-12-28 NOTE — BRIEF OPERATIVE NOTE - NSICDXBRIEFPREOP_GEN_ALL_CORE_FT
PRE-OP DIAGNOSIS:  Atypical ductal hyperplasia of right breast 28-Dec-2020 12:26:35  Neena Dahl  Atypical lobular hyperplasia of right breast 28-Dec-2020 12:26:26  Neena Dahl

## 2020-12-28 NOTE — BRIEF OPERATIVE NOTE - NSICDXBRIEFPOSTOP_GEN_ALL_CORE_FT
POST-OP DIAGNOSIS:  Atypical lobular hyperplasia of right breast 28-Dec-2020 12:26:46  Neena Dahl  Atypical ductal hyperplasia of right breast 28-Dec-2020 12:26:39  Neena Dahl

## 2020-12-28 NOTE — BRIEF OPERATIVE NOTE - OPERATION/FINDINGS
right breast mass #1 (superior/minicork); right breast mass #2 (central/inferior, top hat); right breast mass #3 (lateral/inferior, calcifications); additional posterior margin of right breast mass #3

## 2020-12-28 NOTE — ASU DISCHARGE PLAN (ADULT/PEDIATRIC) - CALL YOUR DOCTOR IF YOU HAVE ANY OF THE FOLLOWING:
Bleeding that does not stop/Swelling that gets worse/Pain not relieved by Medications/Fever greater than (need to indicate Fahrenheit or Celsius)/Wound/Surgical Site with redness, or foul smelling discharge or pus/Numbness, tingling, color or temperature change to extremity/Nausea and vomiting that does not stop/Excessive diarrhea/Inability to tolerate liquids or foods/Increased irritability or sluggishness

## 2020-12-28 NOTE — ASU DISCHARGE PLAN (ADULT/PEDIATRIC) - CARE PROVIDER_API CALL
Neena Dhal (MD)  Surgery  [address] [facility], [address] [place] [address]  Phone: [phone]  Fax: [phone]  Follow Up Time:

## 2020-12-28 NOTE — ASU DISCHARGE PLAN (ADULT/PEDIATRIC) - FOLLOW UP APPOINTMENTS
911 or go to the nearest Emergency Room [facility]:  [facility]…

## 2020-12-28 NOTE — PRE-ANESTHESIA EVALUATION ADULT - BP NONINVASIVE DIASTOLIC (MM HG)
[FreeTextEntry1] : 71 year old male s/p FB, Right VATS, RUL Lobectomy on 4/19/18. Pathology was consistent with Adenocarcinoma, predominantly acinar pattern, T1c, N0. CK 7 and TTF-1 positive and negative for CK20. EVG satin highlights intact elastic visceral pleura.\par \par CT Chest on 3/18/19 reveals:\par - Stable right hemithorax postsurgical changes\par - LLL 3 mm groundglass density nodule, stable\par - RENAE nodular density, 2-3 mm, stable\par - RENAE punctate nodular density just anterior to major fissure, stable\par - Stable tiny pretracheal retrovascular node\par - Stable ascending aortic aneurysm, 4.4 cm\par \par He presents today with complaint of SOB with exertion, ankle pain and left pinky pain.  He denies any fever, chills, cough, chest pain, hemoptysis, or recent illness. 
91

## 2020-12-31 LAB — SURGICAL PATHOLOGY STUDY: SIGNIFICANT CHANGE UP

## 2021-01-01 DIAGNOSIS — E78.5 HYPERLIPIDEMIA, UNSPECIFIED: ICD-10-CM

## 2021-01-01 DIAGNOSIS — N60.21 FIBROADENOSIS OF RIGHT BREAST: ICD-10-CM

## 2021-01-01 DIAGNOSIS — Z85.3 PERSONAL HISTORY OF MALIGNANT NEOPLASM OF BREAST: ICD-10-CM

## 2021-01-01 DIAGNOSIS — I10 ESSENTIAL (PRIMARY) HYPERTENSION: ICD-10-CM

## 2021-01-01 DIAGNOSIS — Z92.21 PERSONAL HISTORY OF ANTINEOPLASTIC CHEMOTHERAPY: ICD-10-CM

## 2021-01-01 DIAGNOSIS — K21.9 GASTRO-ESOPHAGEAL REFLUX DISEASE WITHOUT ESOPHAGITIS: ICD-10-CM

## 2021-01-01 DIAGNOSIS — E78.00 PURE HYPERCHOLESTEROLEMIA, UNSPECIFIED: ICD-10-CM

## 2021-01-01 DIAGNOSIS — D24.1 BENIGN NEOPLASM OF RIGHT BREAST: ICD-10-CM

## 2021-01-01 DIAGNOSIS — N60.31 FIBROSCLEROSIS OF RIGHT BREAST: ICD-10-CM

## 2021-01-01 DIAGNOSIS — N60.81 OTHER BENIGN MAMMARY DYSPLASIAS OF RIGHT BREAST: ICD-10-CM

## 2021-01-01 DIAGNOSIS — Z88.2 ALLERGY STATUS TO SULFONAMIDES: ICD-10-CM

## 2021-01-01 DIAGNOSIS — Z92.3 PERSONAL HISTORY OF IRRADIATION: ICD-10-CM

## 2021-01-01 DIAGNOSIS — Z99.89 DEPENDENCE ON OTHER ENABLING MACHINES AND DEVICES: ICD-10-CM

## 2021-01-01 DIAGNOSIS — Z79.84 LONG TERM (CURRENT) USE OF ORAL HYPOGLYCEMIC DRUGS: ICD-10-CM

## 2021-01-01 DIAGNOSIS — Z90.710 ACQUIRED ABSENCE OF BOTH CERVIX AND UTERUS: ICD-10-CM

## 2021-01-01 DIAGNOSIS — E11.9 TYPE 2 DIABETES MELLITUS WITHOUT COMPLICATIONS: ICD-10-CM

## 2021-01-01 DIAGNOSIS — E03.9 HYPOTHYROIDISM, UNSPECIFIED: ICD-10-CM

## 2021-01-01 DIAGNOSIS — G47.33 OBSTRUCTIVE SLEEP APNEA (ADULT) (PEDIATRIC): ICD-10-CM

## 2021-01-01 DIAGNOSIS — K57.30 DIVERTICULOSIS OF LARGE INTESTINE WITHOUT PERFORATION OR ABSCESS WITHOUT BLEEDING: ICD-10-CM

## 2021-01-01 DIAGNOSIS — I48.91 UNSPECIFIED ATRIAL FIBRILLATION: ICD-10-CM

## 2021-01-01 DIAGNOSIS — E66.01 MORBID (SEVERE) OBESITY DUE TO EXCESS CALORIES: ICD-10-CM

## 2021-01-01 DIAGNOSIS — Z79.82 LONG TERM (CURRENT) USE OF ASPIRIN: ICD-10-CM

## 2021-01-01 DIAGNOSIS — Z79.01 LONG TERM (CURRENT) USE OF ANTICOAGULANTS: ICD-10-CM

## 2021-01-05 ENCOUNTER — NON-APPOINTMENT (OUTPATIENT)
Age: 71
End: 2021-01-05

## 2021-01-15 ENCOUNTER — APPOINTMENT (OUTPATIENT)
Dept: BREAST CENTER | Facility: CLINIC | Age: 71
End: 2021-01-15
Payer: MEDICARE

## 2021-01-15 VITALS
DIASTOLIC BLOOD PRESSURE: 86 MMHG | WEIGHT: 222 LBS | SYSTOLIC BLOOD PRESSURE: 136 MMHG | BODY MASS INDEX: 43.59 KG/M2 | HEIGHT: 60 IN | TEMPERATURE: 97.8 F

## 2021-01-15 DIAGNOSIS — R92.1 MAMMOGRAPHIC CALCIFICATION FOUND ON DIAGNOSTIC IMAGING OF BREAST: ICD-10-CM

## 2021-01-15 PROCEDURE — 99024 POSTOP FOLLOW-UP VISIT: CPT

## 2021-01-15 NOTE — REVIEW OF SYSTEMS
[As Noted in HPI] : as noted in HPI [Negative] : Heme/Lymph [Fever] : no fever [Chills] : no chills [Skin Lesions] : no skin lesions [Skin Wound] : skin wound [Breast Pain] : no breast pain [Breast Lump] : no breast lump [Hot Flashes] : no hot flashes

## 2021-01-15 NOTE — PAST MEDICAL HISTORY
[Menarche Age ____] : age at menarche was [unfilled] [Menopause Age____] : age at menopause was [unfilled] [Total Preg ___] : G[unfilled] [Live Births ___] : P[unfilled]  [Age At Live Birth ___] : Age at live birth: [unfilled] [History of Hormone Replacement Treatment] : has no history of hormone replacement treatment [FreeTextEntry5] : s/p ERNESTINE/DILIA in 2015 [FreeTextEntry7] : yes x 3 months, stopped in 1973 [FreeTextEntry6] : denies [FreeTextEntry8] : denies

## 2021-01-15 NOTE — ASSESSMENT
[FreeTextEntry1] : Brittnee is a 70 F with two areas of R breast ADH, and a third area of calcifications which were not biopsied, s/p R WLE of three areas on 12/28/2020.  \par \par 12/28/2020 -- R WLE of three areas \par 1. superior breast mass \par -ADH\par 2. central mass \par -papilloma, no residual atypia \par 3. lateral calcifications \par -intraductal papilloma \par \par Past breast cancer history: \par -s/p L lumpectomy and SLN Bx in 2007\par -s/p chemo with herceptin (per patient, for stage 1 breast cancer) \par -s/p L WBI x 30 cycles\par \par On exam, she is healing well from her surgery.  She has two surgical incisions which are healing well, she likely has a seroma in her superior breast, but no signs of infection, erythema, or induration. \par \par Her final pathology revealed ADH of her superior mass, no residual atypia in her central mass, and her lateral calcifications resulted in an intraductal papilloma.  No further surgical intervention is indicated at this time. \par \par She will be due for a b/l dx mammogram and US on 6/4/2021.  This will be scheduled for her today.  I will have her follow up after for a CBE. \par \par AS REVIEW: \par \par Her most recent imaging was a b/l screening mammogram and R dx mammogram on 6/4/2020 and 9/11/2020 which revealed three areas of calcifications in her right breast, superior (ADH and mucocele like lesion), lateral (ADH, FEA, radial scar, and ALH), and central (was NOT BIOPSIED YET). \par \par \par \par We discussed the need for a third stereotactic guided biopsy in the central R breast, however, she would like to forego the biopsy and proceed with a excisional biopsy of this area.  (this was discussed with Dr. Dutta)\par \par We discussed atypical ductal hyperplasia.  These are considered benign high risk lesions.  The recommendation is for surgical excision. The reason for surgical excision is because there is about a 15-30% upgrade rate to either DCIS or invasive cancer.  Additionally, the presence of ADH has been associated with an increased lifetime risk of breast cancer by about 4 fold.\par \par We discussed atypical lobular hyperplasia (ALH). \par These lesions are considered benign high risk lesions. \par   \par Patients with these lesions were found to have a increased relative risk of breast cancer, up to four fold  compared to the reference population.  However the lesions themselves do not have any malignant potential. \par The upgrade rate (to cancer) ranges from 0-10% on surgical excision, with pathologic/radiologic concordance, however older studies have documented up to a 60% upgrade rate with lobular neoplasias, including ALH.  \par I have recommended surgical excision for this reason.  \par \par Because it is not readily palpable, I will have her undergo a preoperative radiofrequency tag localization of both biopsy proven ADH (superior and lateral).  In addition, I will have her third area of calcifications in her right central breast also localized with an RF tag. \par \par The risks and benefits of the procedure were explained to the patient, including bleeding, infection, seroma/hematoma formation, and possible re-operation if the surgical excision yields an upgrade to cancer with positive margins. Informed consent was obtained today.\par \par She is on eliquis for atrial fibrillation, so if possible, would like to perform SAME DAY localization of her lesions so that she does not need to stop her anticoagulation multiple times. \par \par All of her questions were answered.  She knows to call with any further questions or concerns. \par \par PLAN: \par -b/l dx mammogram and US 6/4/2021\par -f/up after

## 2021-01-15 NOTE — DATA REVIEWED
[FreeTextEntry1] : Shavon Accession Number : [id number]\par \par LISA HAMLIN                        3\par \par \par \par Surgical Final Report\par \par \par \par \par Final Diagnosis\par 1.  Breast, right mass #2 central, radio frequency seed localized\par lumpectomy:\par -  Sclerosing intraductal papilloma.\par -  Benign breast tissue with proliferative type fibrocystic\par changes including florid duct hyperplasia, stromal fibrosis,\par sclerosing and blunt duct adenosis, cystic/papillary apocrine\par metaplasia, columnar cell change, and microcalcifications.\par -  Healing prior biopsy site with no residual epithelial atypia\par identified in this entirely submitted specimen.\par \par 2.   Breast, right mass #3 lateral calcifications, radio\par frequency seed localized lumpectomy:\par -  Benign atrophic fatty breast tissue with proliferative type\par fibrocystic changes including usual duct hyperplasia, stromal\par fibrosis, sclerosing adenosis, apocrine metaplasia, and\par microcalcifications.\par \par 3.   Breast, right mass #3 posterior margin, excision:\par -  Intraductal papilloma containing both florid duct hyperplasia\par and columnar cell change.\par -  Benign atrophic fatty breast tissue with proliferative type\par fibrocystic changes including florid duct hyperplasia, stromal\par fibrosis, sclerosing and blunt duct adenosis, apocrine\par metaplasia, columnar cell change, and microcalcifications.\par \par 4.  Breast, right mass #1 superior, radio frequency seed\par localized lumpectomy:\par -  Healing prior biopsy site with focal atypical ductal\par hyperplasia (ADH), cribriform type, located 4.0 mm from the\par nearest inked and designated lateral surgical margin (microscopic\par measurement).\par -  Atrophic fatty breast tissue with proliferative type\par fibrocystic changes including florid duct hyperplasia, stromal\par fibrosis, sclerosing adenosis,\par apocrine metaplasia, columnar cell change, and\par microcalcifications.\par \par Verified by: Marcel Ponce M.D.\par (Electronic Signature)\par Reported on: 12/31/20 15:26 EST, [address] [facility] [address]\par Phone: [phone]   Fax: [phone]\par _________________________________________________________________\par \par Clinical History\par Right breast wide local exision of three areas with radio\par frequency localization\par \par \par \par \par \par DANNIEKIMBERLYLISA                        3\par \par \par \par Surgical Final Report\par \par \par \par \par COVID (-)\par \par Specimen(s) Submitted\par 1     Right breast mass #2\par Time obtained: 11:36 am\par 2     Right breast mass #3\par Time obtained: 11:43 am\par 3     Right breast posterior margin\par Time obtained: 11:46 am\par 4     Right breast mass #1\par Time obtained: 12:00 pm\par \par Gross Description\par 1. The specimen is received fresh, labeled "right breast mass #2"\par and consists of a fragment of fibroadipose breast tissue,\par measuring 4 x 3.5 x 1 cm and weighing 12 gm. The specimen is\par oriented by suture as follows: single - anterior, double -\par lateral, triple - superior. The specimen is inked as follows:\par superior - blue, inferior - green, medial - orange, lateral -\par yellow, anterior - red, posterior - black. The specimen is\par serially sectioned from anterior to superior to reveal a 1 cm\par long radio frequency localizer with 2 mm metallic top-hat clip\par inferior to the localizer. The specimen is submitted entirely.\par (10 blocks)\par \par 2. The specimen is received fresh, labeled "right breast mass #3"\par and consists of a fragment of fibroadipose breast tissue,\par measuring 4.5 x 2 x 1 cm and weighing 5 gm. The specimen is\par oriented by suture as follows: single stitch - anterior, double\par stitch - lateral, triple stitch - superior. The specimen is inked\par as follows: superior - blue, inferior - green, medial - orange,\par lateral - yellow, anterior - red, posterior - black. The specimen\par is serially sectioned from anterior to posterior. The specimen is\par submitted entirely. (5 blocks)\par \par 3. The specimen is received fresh, labeled "right breast,\par posterior margin #3 posterior margin"\par and consists of a fragment of tan yellow lobulated adipose tissue\par measuring 3 x 3 x 0.5 cm.\par The new stitch margin is inked black. The remainder is inked red,\par The sutured new margin is inked black.  The opposite side is\par inked red. The cut surface appears uniform yellow.  The specimen\par is submitted entirely. (3 blocks)\par \par 4. The specimen is received fresh, labeled "right breast mass #1"\par and consists of a fragment of fibroadipose breast tissue,\par measuring 3.5 x 2.5 x 1 cm and weighing 8 gm. The specimen is\par oriented by suture as follows: single -\par \par \par \par \par \par LISA HAMLIN                        3\par \par \par \par Surgical Final Report\par \par \par \par \par anterior, double - lateral, triple - superior. The specimen is\par inked as follows: superior - blue, inferior - green, medial -\par orange, lateral - yellow, anterior - red, posterior - black. The\par specimen is serially sectioned from anterior to posterior to\par reveal a white/tan fibrous cut surface. A radio frequency\par localizer is found close to the inferior margin. The specimen is\par submitted entirely. (7 blocks)\par \par Specimen was received and underwent gross examination at [facility], [address] [place] [address].\par \par 12/28/20 16:18 jl\par \par Perioperative Diagnosis\par Right atypical lobular hyperplasia

## 2021-01-15 NOTE — HISTORY OF PRESENT ILLNESS
[FreeTextEntry1] : Brittnee is a 70 F with two areas of R breast ADH, and a third area of calcifications which were not biopsied, s/p R WLE of three areas on 2020.  \par \par 2020 -- R WLE of three areas \par 1. superior breast mass \par -ADH\par 2. central mass \par -papilloma, no residual atypia \par 3. lateral calcifications \par -intraductal papilloma \par \par Past breast cancer history: \par -s/p L lumpectomy and SLN Bx in \par -s/p chemo with herceptin (per patient, for stage 1 breast cancer) \par -s/p L WBI x 30 cycles\par \par She has no breast related complaints at this time.  She denies any breast pain, has not palpated any new palpable masses in either breast and denies any nipple discharge or retraction.  \par \par Her work up was as follows: \par 2020 -- b/l screening mammogram \par -heterogenously dense breasts\par -calcifications in UOQ, R \par -no suspicious mass, microcalcifications or architectural distortion in L \par BIRADS 0\par \par 2020 -- R dx mammogram \par -grouped microcalcifications in R superior breast \par -TWO additional smaller groups of calcifications in central and lateral breast, more posterior \par BIRADS 4 -- biopsy 2 of the three areas of calcs\par \par 2020 -- R stereotactic guided biopsy \par 1. superior calcs (minicork)\par -atypical ductal hyperplasia\par -mucocele like lesion \par \par 2. central calcifcations (top hat)\par -ADH/flat epithelial atypia\par -radial scar \par -ALH \par \par **Recommendation to biopsy the third group of calcifications in the central breast, not performed yet \par \par Of note, Brittnee was recently diagnosed with atrial fibrillation which required an ablation.  She is currently on Eliquis.  In addition, she does use a CPAP machine at home for obstructive sleep apnea. \par \par HISTORICAL RISK FACTORS: \par -prior breast biopsies and surgeries as above\par -no family history of breast or ovarian cancer \par -, age at first live birth was 26 \par -prior OCP use x 3 months, stopped in \par -s/p ERNESTINE/BSO in  \par \par INTERVAL HISTORY: \par Brittnee returns for her FIRST POST OP VISIT, s/p R WLE of three areas on 2020. \par She is healing well from her recent surgery.  She denies any breast pain, but does have an occasional burning sensation in the central breast with some itchiness.  \par \par She denies any fevers, chills, drainage or redness. \par Her final pathology revealed ADH of her superior mass, no residual atypia in her central mass, and her lateral calcifications resulted in an intraductal papilloma.  No further surgical intervention is indicated at this time.

## 2021-01-15 NOTE — PHYSICAL EXAM
[Normocephalic] : normocephalic [Atraumatic] : atraumatic [EOMI] : extra ocular movement intact [No Supraclavicular Adenopathy] : no supraclavicular adenopathy [No Cervical Adenopathy] : no cervical adenopathy [Examined in the supine and seated position] : examined in the supine and seated position [Symmetrical] : symmetrical [No dominant masses] : no dominant masses in right breast  [No dominant masses] : no dominant masses left breast [No Nipple Retraction] : no left nipple retraction [No Nipple Discharge] : no left nipple discharge [No Axillary Lymphadenopathy] : no left axillary lymphadenopathy [Soft] : abdomen soft [Not Tender] : non-tender [No Edema] : no edema [No Rashes] : no rashes [No Ulceration] : no ulceration [de-identified] : no suspicious abnormalities palpated within either breast, although she does have b/l nondiscrete nodularities  [de-identified] : superior surgical incision is healing well with a likely seroma, but no signs of infection; inferior surgical incision is also healing well, no signs of infection  [de-identified] : surgical incisions are well healed

## 2021-02-24 ENCOUNTER — OUTPATIENT (OUTPATIENT)
Dept: OUTPATIENT SERVICES | Facility: HOSPITAL | Age: 71
LOS: 1 days | Discharge: HOME | End: 2021-02-24
Payer: MEDICARE

## 2021-02-24 DIAGNOSIS — R06.89 OTHER ABNORMALITIES OF BREATHING: ICD-10-CM

## 2021-02-24 DIAGNOSIS — Z90.710 ACQUIRED ABSENCE OF BOTH CERVIX AND UTERUS: Chronic | ICD-10-CM

## 2021-02-24 DIAGNOSIS — C50.912 MALIGNANT NEOPLASM OF UNSPECIFIED SITE OF LEFT FEMALE BREAST: Chronic | ICD-10-CM

## 2021-02-24 PROCEDURE — 71046 X-RAY EXAM CHEST 2 VIEWS: CPT | Mod: 26

## 2021-04-10 ENCOUNTER — LABORATORY RESULT (OUTPATIENT)
Age: 71
End: 2021-04-10

## 2021-04-10 ENCOUNTER — OUTPATIENT (OUTPATIENT)
Dept: OUTPATIENT SERVICES | Facility: HOSPITAL | Age: 71
LOS: 1 days | Discharge: HOME | End: 2021-04-10

## 2021-04-10 DIAGNOSIS — Z90.710 ACQUIRED ABSENCE OF BOTH CERVIX AND UTERUS: Chronic | ICD-10-CM

## 2021-04-10 DIAGNOSIS — Z11.59 ENCOUNTER FOR SCREENING FOR OTHER VIRAL DISEASES: ICD-10-CM

## 2021-04-10 DIAGNOSIS — C50.912 MALIGNANT NEOPLASM OF UNSPECIFIED SITE OF LEFT FEMALE BREAST: Chronic | ICD-10-CM

## 2021-04-13 ENCOUNTER — APPOINTMENT (OUTPATIENT)
Dept: PULMONOLOGY | Facility: CLINIC | Age: 71
End: 2021-04-13
Payer: MEDICARE

## 2021-04-13 PROCEDURE — 99072 ADDL SUPL MATRL&STAF TM PHE: CPT

## 2021-04-13 PROCEDURE — 94010 BREATHING CAPACITY TEST: CPT

## 2021-04-13 PROCEDURE — 94729 DIFFUSING CAPACITY: CPT

## 2021-04-13 PROCEDURE — 94727 GAS DIL/WSHOT DETER LNG VOL: CPT

## 2021-06-10 ENCOUNTER — OUTPATIENT (OUTPATIENT)
Dept: OUTPATIENT SERVICES | Facility: HOSPITAL | Age: 71
LOS: 1 days | Discharge: HOME | End: 2021-06-10
Payer: MEDICARE

## 2021-06-10 ENCOUNTER — RESULT REVIEW (OUTPATIENT)
Age: 71
End: 2021-06-10

## 2021-06-10 ENCOUNTER — NON-APPOINTMENT (OUTPATIENT)
Age: 71
End: 2021-06-10

## 2021-06-10 DIAGNOSIS — Z90.710 ACQUIRED ABSENCE OF BOTH CERVIX AND UTERUS: Chronic | ICD-10-CM

## 2021-06-10 DIAGNOSIS — C50.912 MALIGNANT NEOPLASM OF UNSPECIFIED SITE OF LEFT FEMALE BREAST: Chronic | ICD-10-CM

## 2021-06-10 DIAGNOSIS — R92.8 OTHER ABNORMAL AND INCONCLUSIVE FINDINGS ON DIAGNOSTIC IMAGING OF BREAST: ICD-10-CM

## 2021-06-10 DIAGNOSIS — Z12.31 ENCOUNTER FOR SCREENING MAMMOGRAM FOR MALIGNANT NEOPLASM OF BREAST: ICD-10-CM

## 2021-06-10 PROCEDURE — 77066 DX MAMMO INCL CAD BI: CPT | Mod: 26

## 2021-06-10 PROCEDURE — 76641 ULTRASOUND BREAST COMPLETE: CPT | Mod: 26,50

## 2021-06-10 PROCEDURE — G0279: CPT | Mod: 26

## 2021-07-23 ENCOUNTER — APPOINTMENT (OUTPATIENT)
Dept: BREAST CENTER | Facility: CLINIC | Age: 71
End: 2021-07-23
Payer: MEDICARE

## 2021-07-23 VITALS
HEIGHT: 60 IN | TEMPERATURE: 98.7 F | DIASTOLIC BLOOD PRESSURE: 90 MMHG | WEIGHT: 222 LBS | BODY MASS INDEX: 43.59 KG/M2 | SYSTOLIC BLOOD PRESSURE: 140 MMHG

## 2021-07-23 DIAGNOSIS — N60.91 UNSPECIFIED BENIGN MAMMARY DYSPLASIA OF RIGHT BREAST: ICD-10-CM

## 2021-07-23 DIAGNOSIS — R92.2 INCONCLUSIVE MAMMOGRAM: ICD-10-CM

## 2021-07-23 PROCEDURE — 99213 OFFICE O/P EST LOW 20 MIN: CPT

## 2021-07-23 PROCEDURE — 99072 ADDL SUPL MATRL&STAF TM PHE: CPT

## 2021-07-25 PROBLEM — N60.91 ATYPICAL DUCTAL HYPERPLASIA OF RIGHT BREAST: Status: ACTIVE | Noted: 2020-11-30

## 2021-07-25 NOTE — HISTORY OF PRESENT ILLNESS
[FreeTextEntry1] : Brittnee is a 70 F with two areas of R breast ADH, and a third area of calcifications which were not biopsied, s/p R WLE of three areas on 2020.  \par \par 2020 -- R WLE of three areas \par 1. superior breast mass \par -ADH\par 2. central mass \par -papilloma, no residual atypia \par 3. lateral calcifications \par -intraductal papilloma \par \par Past breast cancer history: \par -s/p L lumpectomy and SLN Bx in \par -s/p chemo with herceptin (per patient, for stage 1 breast cancer) \par -s/p L WBI x 30 cycles\par \par She has no breast related complaints at this time.  She denies any breast pain, has not palpated any new palpable masses in either breast and denies any nipple discharge or retraction.  \par \par Her work up was as follows: \par 2020 -- b/l screening mammogram \par -heterogenously dense breasts\par -calcifications in UOQ, R \par -no suspicious mass, microcalcifications or architectural distortion in L \par BIRADS 0\par \par 2020 -- R dx mammogram \par -grouped microcalcifications in R superior breast \par -TWO additional smaller groups of calcifications in central and lateral breast, more posterior \par BIRADS 4 -- biopsy 2 of the three areas of calcs\par \par 2020 -- R stereotactic guided biopsy \par 1. superior calcs (minicork)\par -atypical ductal hyperplasia\par -mucocele like lesion \par \par 2. central calcifcations (top hat)\par -ADH/flat epithelial atypia\par -radial scar \par -ALH \par \par **Recommendation to biopsy the third group of calcifications in the central breast, not performed yet \par \par Of note, Brittnee was recently diagnosed with atrial fibrillation which required an ablation.  She is currently on Eliquis.  In addition, she does use a CPAP machine at home for obstructive sleep apnea. \par \par HISTORICAL RISK FACTORS: \par -prior breast biopsies and surgeries as above\par -no family history of breast or ovarian cancer \par -, age at first live birth was 26 \par -prior OCP use x 3 months, stopped in \par -s/p ERNESTINE/BSO in  \par \par INTERVAL HISTORY: \génesis Beaulieu returns for her FIRST POST OP VISIT, s/p R WLE of three areas on 2020. \par She is healing well from her recent surgery.  She denies any breast pain, but does have an occasional burning sensation in the central breast with some itchiness.  \par \par She denies any fevers, chills, drainage or redness. \par Her final pathology revealed ADH of her superior mass, no residual atypia in her central mass, and her lateral calcifications resulted in an intraductal papilloma.  No further surgical intervention is indicated at this time. \par \par INTERVAL HISTORY: antonieta Beaulieu returns for a short term follow up visit. \par \par She has occasional burning pains in the retroareolar area, but denies any nipple discharge or retraction and has not palpated any abnormal masses.  \par \par Her most recent imaging was a b/l dx mammogram and US on 6/10/2021 which revealed post surgical changes in bilateral breasts, deemed BIRADS 2.

## 2021-07-25 NOTE — ASSESSMENT
[FreeTextEntry1] : Brittnee is a 70 F with two areas of R breast ADH, and a third area of calcifications which were not biopsied, s/p R WLE of three areas on 12/28/2020.  \par \par 12/28/2020 -- R WLE of three areas \par 1. superior breast mass \par -ADH\par 2. central mass \par -papilloma, no residual atypia \par 3. lateral calcifications \par -intraductal papilloma \par \par Past breast cancer history: \par -s/p L lumpectomy and SLN Bx in 2007\par -s/p chemo with herceptin (per patient, for stage 1 breast cancer) \par -s/p L WBI x 30 cycles\par \par On exam, she had b/l nondiscrete nodularities palpated, but no suspicious mass was palpated within either breast. \par \par Her most recent imaging was a b/l dx mammogram and US on 6/10/2021 which revealed post surgical changes in bilateral breasts, deemed BIRADS 2. \par \par She will be due for a b/l screening mammogram and US on 6/10/2022.  THis will be scheduled for her today. She can follow up as needed as she will be moving to [place] and will no longer be able to follow up here. \par \par AS REVIEW:\par Her final pathology revealed ADH of her superior mass, no residual atypia in her central mass, and her lateral calcifications resulted in an intraductal papilloma.  No further surgical intervention is indicated at this time. \par \par Her most recent imaging was a b/l screening mammogram and R dx mammogram on 6/4/2020 and 9/11/2020 which revealed three areas of calcifications in her right breast, superior (ADH and mucocele like lesion), lateral (ADH, FEA, radial scar, and ALH), and central (was NOT BIOPSIED YET). \par \par We discussed the need for a third stereotactic guided biopsy in the central R breast, however, she would like to forego the biopsy and proceed with a excisional biopsy of this area.  (this was discussed with Dr. Dutta)\par \par We discussed atypical ductal hyperplasia.  These are considered benign high risk lesions.  The recommendation is for surgical excision. The reason for surgical excision is because there is about a 15-30% upgrade rate to either DCIS or invasive cancer.  Additionally, the presence of ADH has been associated with an increased lifetime risk of breast cancer by about 4 fold.\par \par We discussed atypical lobular hyperplasia (ALH). \par These lesions are considered benign high risk lesions. \par   \par Patients with these lesions were found to have a increased relative risk of breast cancer, up to four fold  compared to the reference population.  However the lesions themselves do not have any malignant potential. \par The upgrade rate (to cancer) ranges from 0-10% on surgical excision, with pathologic/radiologic concordance, however older studies have documented up to a 60% upgrade rate with lobular neoplasias, including ALH.  \par I have recommended surgical excision for this reason.  \par \par Because it is not readily palpable, I will have her undergo a preoperative radiofrequency tag localization of both biopsy proven ADH (superior and lateral).  In addition, I will have her third area of calcifications in her right central breast also localized with an RF tag. \par \par She is on eliquis for atrial fibrillation, so if possible, would like to perform SAME DAY localization of her lesions so that she does not need to stop her anticoagulation multiple times. \par \par All of her questions were answered.  She knows to call with any further questions or concerns. \par \par PLAN: \par -b/l screening mammogram and US 6/10/2022\par -f/up PRN as she is moving to [place]

## 2021-07-25 NOTE — PHYSICAL EXAM
[Normocephalic] : normocephalic [Atraumatic] : atraumatic [EOMI] : extra ocular movement intact [No Supraclavicular Adenopathy] : no supraclavicular adenopathy [No Cervical Adenopathy] : no cervical adenopathy [Examined in the supine and seated position] : examined in the supine and seated position [Symmetrical] : symmetrical [No dominant masses] : no dominant masses in right breast  [No dominant masses] : no dominant masses left breast [No Nipple Retraction] : no left nipple retraction [No Nipple Discharge] : no left nipple discharge [No Axillary Lymphadenopathy] : no left axillary lymphadenopathy [Soft] : abdomen soft [Not Tender] : non-tender [No Edema] : no edema [No Rashes] : no rashes [No Ulceration] : no ulceration [de-identified] : no suspicious abnormalities palpated within either breast, although she does have b/l nondiscrete nodularities  [de-identified] : surgical incisions are well healed, no suspicious abnormalities were palpated  [de-identified] : surgical incisions are well healed

## 2021-07-25 NOTE — REVIEW OF SYSTEMS
[As Noted in HPI] : as noted in HPI [Negative] : Heme/Lymph [Fever] : no fever [Chills] : no chills [Skin Lesions] : no skin lesions [Skin Wound] : no skin wound [Breast Pain] : no breast pain [Breast Lump] : no breast lump [Hot Flashes] : no hot flashes

## 2021-07-25 NOTE — PAST MEDICAL HISTORY
[Menarche Age ____] : age at menarche was [unfilled] [Menopause Age____] : age at menopause was [unfilled] [Total Preg ___] : G[unfilled] [Live Births ___] : P[unfilled]  [Age At Live Birth ___] : Age at live birth: [unfilled] [History of Hormone Replacement Treatment] : has no history of hormone replacement treatment [FreeTextEntry5] : s/p ERNESTINE/DILIA in 2015 [FreeTextEntry6] : denies [FreeTextEntry7] : yes x 3 months, stopped in 1973 [FreeTextEntry8] : denies

## 2023-01-24 NOTE — H&P PST ADULT - NSWEIGHTCALCTOOLDRUG_GEN_A_CORE
Impression: Exdtve age-rel mclr degn, right eye, with actv chrdl neovas: H35.3211. SRF noted per exam and OCT. Plan: Active CNVM OD - fluid resolving. Inject Avastin today OD.  Follow-up in 4 weeks for avastin OD with Dr Kerri Pardo  used

## 2024-01-04 NOTE — H&P PST ADULT - SPO2 (%)
Telephone call to Naida/daughter.  Left voicemail of reason for call with request for return phone call.  Call back number was provided.    [phone]